# Patient Record
Sex: FEMALE | Race: WHITE | NOT HISPANIC OR LATINO | Employment: FULL TIME | URBAN - METROPOLITAN AREA
[De-identification: names, ages, dates, MRNs, and addresses within clinical notes are randomized per-mention and may not be internally consistent; named-entity substitution may affect disease eponyms.]

---

## 2024-03-26 ENCOUNTER — TRANSCRIBE ORDERS (OUTPATIENT)
Dept: OBSTETRICS AND GYNECOLOGY | Facility: HOSPITAL | Age: 34
End: 2024-03-26
Payer: MEDICAID

## 2024-03-26 DIAGNOSIS — O35.07X0 FETAL MICROCEPHALY AFFECTING ANTEPARTUM CARE OF MOTHER, SINGLE OR UNSPECIFIED FETUS: ICD-10-CM

## 2024-03-26 DIAGNOSIS — Z34.90 PREGNANCY, UNSPECIFIED GESTATIONAL AGE: ICD-10-CM

## 2024-03-26 DIAGNOSIS — O28.3 ABNORMAL FETAL ULTRASOUND: Primary | ICD-10-CM

## 2024-04-02 ENCOUNTER — HOSPITAL ENCOUNTER (OUTPATIENT)
Dept: WOMENS IMAGING | Facility: HOSPITAL | Age: 34
Discharge: HOME | End: 2024-04-02
Admitting: ADVANCED PRACTICE MIDWIFE
Payer: MEDICAID

## 2024-04-02 ENCOUNTER — OFFICE VISIT (OUTPATIENT)
Dept: OBSTETRICS AND GYNECOLOGY | Facility: HOSPITAL | Age: 34
End: 2024-04-02
Payer: MEDICAID

## 2024-04-02 VITALS
WEIGHT: 182.4 LBS | HEIGHT: 68 IN | SYSTOLIC BLOOD PRESSURE: 118 MMHG | DIASTOLIC BLOOD PRESSURE: 70 MMHG | BODY MASS INDEX: 27.65 KG/M2

## 2024-04-02 DIAGNOSIS — Z34.90 PREGNANCY, UNSPECIFIED GESTATIONAL AGE: ICD-10-CM

## 2024-04-02 DIAGNOSIS — Z03.73 FETAL ANOMALY SUSPECTED BUT NOT FOUND: ICD-10-CM

## 2024-04-02 DIAGNOSIS — O35.07X0 MICROCEPHALY OF FETUS: Primary | ICD-10-CM

## 2024-04-02 DIAGNOSIS — O28.3 ABNORMAL FETAL ULTRASOUND: ICD-10-CM

## 2024-04-02 DIAGNOSIS — O35.07X0 FETAL MICROCEPHALY AFFECTING ANTEPARTUM CARE OF MOTHER, SINGLE OR UNSPECIFIED FETUS: ICD-10-CM

## 2024-04-02 PROCEDURE — 76819 FETAL BIOPHYS PROFIL W/O NST: CPT

## 2024-04-02 PROCEDURE — 76811 OB US DETAILED SNGL FETUS: CPT

## 2024-04-02 PROCEDURE — 99203 OFFICE O/P NEW LOW 30 MIN: CPT | Performed by: OBSTETRICS & GYNECOLOGY

## 2024-04-02 PROCEDURE — 76811 OB US DETAILED SNGL FETUS: CPT | Performed by: OBSTETRICS & GYNECOLOGY

## 2024-04-02 PROCEDURE — 76819 FETAL BIOPHYS PROFIL W/O NST: CPT | Performed by: OBSTETRICS & GYNECOLOGY

## 2024-04-02 RX ORDER — FERROUS SULFATE 300 MG/5ML
300 LIQUID (ML) ORAL DAILY
COMMUNITY

## 2024-04-02 RX ORDER — CALCIUM CARBONATE 750 MG/1
750 TABLET, CHEWABLE ORAL AS NEEDED
COMMUNITY

## 2024-04-02 NOTE — ASSESSMENT & PLAN NOTE
Microcephaly is defined as a head circumference three standard deviations below the mean.  Microcephaly is often associated with ventriculomegaly, sloping forehead or other brain anomalies (67%).  The differential diagnosis can include craniosynostosis, atelencephaly or a normal variant for a small head size that could be familial.      There are multiple potential etiologies for microcephaly including teratogen exposure (alcohol, hydrantoin, radiation), prenatal infection (cytomegalovirus, rubella, toxoplasmosis) or part of numerous syndromes ([place], Violet-Laxova, etc).  Genetic abnormalities associated with microcephaly include trisomies 13, 18, 21, 4p- and 18q-.  It can also be associated with single gene defects such as Fanconi syndrome, Milton syndrome and maternal PKU.  Unfortunately, in many cases the etiology is often unknown.      The prognosis for an infant with microcephaly is dependent on the severity and underlying etiology.  Minor microcephaly is often associated with a normal outcome and a false positive diagnosis is common.  Newborns should be evaluation by pediatricians and/or pediatric neurologists to confirm the diagnosis.  If present as a , early childhood intervention programs that involve physical, speech and occupational therapists can help to maximize abilities and minimize dysfunction.      In fetuses with no other abnormalities and a normal-appearing CNS there is no increased risk of abnormalities if the head circumference is greater than 3 standard deviations below the mean.  This fetus has a head circumference between 1 and 2 standard deviations below the mean and would not be expected to be at increased risk for anomalies.

## 2024-04-02 NOTE — PROGRESS NOTES
"Documentation of the ultrasound findings, images, and interpretations will be available in the patient's Viewpoint report which is located in the imaging tab in chart review.    Maternal/Fetal Medicine Consult Note     Name: Kaylan Webb    : 1990     MRN: [id number]     Referring Provider: Francine Wang CNM    Chief Complaint  BPD, and Small HC     Subjective     History of Present Illness:  Kaylan Webb is a 33 y.o.  33w1d who presents today for fetal HC lag    REHAN: Estimated Date of Delivery: 24     ROS:   As noted in HPI.     Past Medical History:   Diagnosis Date    exercise induced Asthma       Past Surgical History:   Procedure Laterality Date    DILATATION AND CURETTAGE      WISDOM TOOTH EXTRACTION        OB History          4    Para   1    Term   1       0    AB   2    Living   1         SAB   2    IAB   0    Ectopic   0    Molar   0    Multiple   0    Live Births   1          Obstetric Comments   Fob #1 - Pregnancy #1 - #4                Objective     Vital Signs  /70   Ht 171.5 cm (67.5\")   Wt 82.7 kg (182 lb 6.4 oz)   LMP 2023   Estimated body mass index is 28.15 kg/m² as calculated from the following:    Height as of this encounter: 171.5 cm (67.5\").    Weight as of this encounter: 82.7 kg (182 lb 6.4 oz).    Physical Exam    Ultrasound Impression:   See [facility]    Assessment and Plan     Kaylan Webb is a 33 y.o.  33w1d who presents today for fetal HC lag    Diagnoses and all orders for this visit:    1. Microcephaly of fetus (Primary)  Assessment & Plan:  Microcephaly is defined as a head circumference three standard deviations below the mean.  Microcephaly is often associated with ventriculomegaly, sloping forehead or other brain anomalies (67%).  The differential diagnosis can include craniosynostosis, atelencephaly or a normal variant for a small head size that could be familial.      There are multiple potential etiologies for " microcephaly including teratogen exposure (alcohol, hydrantoin, radiation), prenatal infection (cytomegalovirus, rubella, toxoplasmosis) or part of numerous syndromes ([place], Violet-Laxova, etc).  Genetic abnormalities associated with microcephaly include trisomies 13, 18, 21, 4p- and 18q-.  It can also be associated with single gene defects such as Fanconi syndrome, Milton syndrome and maternal PKU.  Unfortunately, in many cases the etiology is often unknown.      The prognosis for an infant with microcephaly is dependent on the severity and underlying etiology.  Minor microcephaly is often associated with a normal outcome and a false positive diagnosis is common.  Newborns should be evaluation by pediatricians and/or pediatric neurologists to confirm the diagnosis.  If present as a , early childhood intervention programs that involve physical, speech and occupational therapists can help to maximize abilities and minimize dysfunction.      In fetuses with no other abnormalities and a normal-appearing CNS there is no increased risk of abnormalities if the head circumference is greater than 3 standard deviations below the mean.  This fetus has a head circumference between 1 and 2 standard deviations below the mean and would not be expected to be at increased risk for anomalies.      2. Fetal anomaly suspected but not found    3. Pregnancy, unspecified gestational age         Follow Up  No follow-ups on file.    I spent 15 minutes caring for the patient on the day of service. This included: obtaining or reviewing a separately obtained medical history, reviewing patient records, performing a medically appropriate exam and/or evaluation, counseling or educating the patient/family/caregiver, ordering medications, labs, and/or procedures and documenting such in the medical record. This does not include time spent on review and interpretation of other tests such as fetal ultrasound or the performance of  other procedures such as amniocentesis or CVS.      Douglas A. Milligan, MD  Maternal Fetal Medicine, [facility]     2024

## 2024-05-14 ENCOUNTER — HOSPITAL ENCOUNTER (INPATIENT)
Facility: HOSPITAL | Age: 34
LOS: 1 days | Discharge: HOME | End: 2024-05-15
Attending: ADVANCED PRACTICE MIDWIFE | Admitting: OBSTETRICS & GYNECOLOGY
Payer: MEDICAID

## 2024-05-14 PROBLEM — Z34.90 PREGNANCY: Status: RESOLVED | Noted: 2024-04-02 | Resolved: 2024-05-14

## 2024-05-14 PROBLEM — O35.07X0 MICROCEPHALY OF FETUS: Status: RESOLVED | Noted: 2024-04-02 | Resolved: 2024-05-14

## 2024-05-14 PROBLEM — Z34.90 TERM PREGNANCY: Status: ACTIVE | Noted: 2024-05-14

## 2024-05-14 PROBLEM — Z03.73 FETAL ANOMALY SUSPECTED BUT NOT FOUND: Status: RESOLVED | Noted: 2024-04-02 | Resolved: 2024-05-14

## 2024-05-14 LAB
ABO GROUP BLD: NORMAL
ABO GROUP BLD: NORMAL
BLD GP AB SCN SERPL QL: NEGATIVE
DEPRECATED RDW RBC AUTO: 46.5 FL (ref 37–54)
ERYTHROCYTE [DISTWIDTH] IN BLOOD BY AUTOMATED COUNT: 14.6 % (ref 12.3–15.4)
HCT VFR BLD AUTO: 41 % (ref 34–46.6)
HGB BLD-MCNC: 13.9 G/DL (ref 12–15.9)
MCH RBC QN AUTO: 29.8 PG (ref 26.6–33)
MCHC RBC AUTO-ENTMCNC: 33.9 G/DL (ref 31.5–35.7)
MCV RBC AUTO: 87.8 FL (ref 79–97)
PLATELET # BLD AUTO: 231 10*3/MM3 (ref 140–450)
PMV BLD AUTO: 10.4 FL (ref 6–12)
RBC # BLD AUTO: 4.67 10*6/MM3 (ref 3.77–5.28)
RH BLD: POSITIVE
RH BLD: POSITIVE
T PALLIDUM IGG SER QL: NORMAL
T&S EXPIRATION DATE: NORMAL
WBC NRBC COR # BLD AUTO: 12.39 10*3/MM3 (ref 3.4–10.8)

## 2024-05-14 PROCEDURE — 85027 COMPLETE CBC AUTOMATED: CPT | Performed by: ADVANCED PRACTICE MIDWIFE

## 2024-05-14 PROCEDURE — 86780 TREPONEMA PALLIDUM: CPT | Performed by: ADVANCED PRACTICE MIDWIFE

## 2024-05-14 PROCEDURE — 86900 BLOOD TYPING SEROLOGIC ABO: CPT | Performed by: ADVANCED PRACTICE MIDWIFE

## 2024-05-14 PROCEDURE — 25810000003 LACTATED RINGERS PER 1000 ML: Performed by: ADVANCED PRACTICE MIDWIFE

## 2024-05-14 PROCEDURE — 3E033VJ INTRODUCTION OF OTHER HORMONE INTO PERIPHERAL VEIN, PERCUTANEOUS APPROACH: ICD-10-PCS | Performed by: ADVANCED PRACTICE MIDWIFE

## 2024-05-14 PROCEDURE — 86900 BLOOD TYPING SEROLOGIC ABO: CPT

## 2024-05-14 PROCEDURE — 59025 FETAL NON-STRESS TEST: CPT

## 2024-05-14 PROCEDURE — 86850 RBC ANTIBODY SCREEN: CPT | Performed by: ADVANCED PRACTICE MIDWIFE

## 2024-05-14 PROCEDURE — 25810000003 SODIUM CHLORIDE 0.9 % SOLUTION: Performed by: ADVANCED PRACTICE MIDWIFE

## 2024-05-14 PROCEDURE — 86901 BLOOD TYPING SEROLOGIC RH(D): CPT | Performed by: ADVANCED PRACTICE MIDWIFE

## 2024-05-14 PROCEDURE — 25010000002 OXYTOCIN PER 10 UNITS: Performed by: ADVANCED PRACTICE MIDWIFE

## 2024-05-14 PROCEDURE — 10907ZC DRAINAGE OF AMNIOTIC FLUID, THERAPEUTIC FROM PRODUCTS OF CONCEPTION, VIA NATURAL OR ARTIFICIAL OPENING: ICD-10-PCS | Performed by: ADVANCED PRACTICE MIDWIFE

## 2024-05-14 PROCEDURE — 86901 BLOOD TYPING SEROLOGIC RH(D): CPT

## 2024-05-14 RX ORDER — OXYTOCIN/0.9 % SODIUM CHLORIDE 30/500 ML
250 PLASTIC BAG, INJECTION (ML) INTRAVENOUS CONTINUOUS
Status: ACTIVE | OUTPATIENT
Start: 2024-05-14 | End: 2024-05-14

## 2024-05-14 RX ORDER — ACETAMINOPHEN 325 MG/1
650 TABLET ORAL EVERY 4 HOURS PRN
Status: DISCONTINUED | OUTPATIENT
Start: 2024-05-14 | End: 2024-05-14 | Stop reason: HOSPADM

## 2024-05-14 RX ORDER — IBUPROFEN 600 MG/1
600 TABLET ORAL EVERY 6 HOURS PRN
Status: DISCONTINUED | OUTPATIENT
Start: 2024-05-14 | End: 2024-05-14

## 2024-05-14 RX ORDER — OXYTOCIN/0.9 % SODIUM CHLORIDE 30/500 ML
999 PLASTIC BAG, INJECTION (ML) INTRAVENOUS ONCE
Status: DISCONTINUED | OUTPATIENT
Start: 2024-05-14 | End: 2024-05-15 | Stop reason: HOSPADM

## 2024-05-14 RX ORDER — HYDROCODONE BITARTRATE AND ACETAMINOPHEN 10; 325 MG/1; MG/1
1 TABLET ORAL EVERY 4 HOURS PRN
Status: DISCONTINUED | OUTPATIENT
Start: 2024-05-14 | End: 2024-05-15 | Stop reason: HOSPADM

## 2024-05-14 RX ORDER — CARBOPROST TROMETHAMINE 250 UG/ML
250 INJECTION, SOLUTION INTRAMUSCULAR AS NEEDED
Status: DISCONTINUED | OUTPATIENT
Start: 2024-05-14 | End: 2024-05-14 | Stop reason: HOSPADM

## 2024-05-14 RX ORDER — ONDANSETRON 4 MG/1
4 TABLET, ORALLY DISINTEGRATING ORAL EVERY 6 HOURS PRN
Status: DISCONTINUED | OUTPATIENT
Start: 2024-05-14 | End: 2024-05-14 | Stop reason: HOSPADM

## 2024-05-14 RX ORDER — MISOPROSTOL 200 UG/1
800 TABLET ORAL AS NEEDED
Status: DISCONTINUED | OUTPATIENT
Start: 2024-05-14 | End: 2024-05-14 | Stop reason: HOSPADM

## 2024-05-14 RX ORDER — DOCUSATE SODIUM 100 MG/1
100 CAPSULE, LIQUID FILLED ORAL 2 TIMES DAILY
Status: DISCONTINUED | OUTPATIENT
Start: 2024-05-14 | End: 2024-05-15 | Stop reason: HOSPADM

## 2024-05-14 RX ORDER — ONDANSETRON 2 MG/ML
4 INJECTION INTRAMUSCULAR; INTRAVENOUS EVERY 6 HOURS PRN
Status: DISCONTINUED | OUTPATIENT
Start: 2024-05-14 | End: 2024-05-14

## 2024-05-14 RX ORDER — SODIUM CHLORIDE 0.9 % (FLUSH) 0.9 %
10 SYRINGE (ML) INJECTION EVERY 12 HOURS SCHEDULED
Status: DISCONTINUED | OUTPATIENT
Start: 2024-05-14 | End: 2024-05-14 | Stop reason: HOSPADM

## 2024-05-14 RX ORDER — ONDANSETRON 2 MG/ML
4 INJECTION INTRAMUSCULAR; INTRAVENOUS EVERY 6 HOURS PRN
Status: DISCONTINUED | OUTPATIENT
Start: 2024-05-14 | End: 2024-05-15 | Stop reason: HOSPADM

## 2024-05-14 RX ORDER — LIDOCAINE HYDROCHLORIDE 10 MG/ML
0.5 INJECTION, SOLUTION EPIDURAL; INFILTRATION; INTRACAUDAL; PERINEURAL ONCE AS NEEDED
Status: DISCONTINUED | OUTPATIENT
Start: 2024-05-14 | End: 2024-05-14 | Stop reason: HOSPADM

## 2024-05-14 RX ORDER — SODIUM CHLORIDE 9 MG/ML
40 INJECTION, SOLUTION INTRAVENOUS AS NEEDED
Status: DISCONTINUED | OUTPATIENT
Start: 2024-05-14 | End: 2024-05-14 | Stop reason: HOSPADM

## 2024-05-14 RX ORDER — BISACODYL 10 MG
10 SUPPOSITORY, RECTAL RECTAL DAILY PRN
Status: DISCONTINUED | OUTPATIENT
Start: 2024-05-15 | End: 2024-05-15 | Stop reason: HOSPADM

## 2024-05-14 RX ORDER — SODIUM CHLORIDE, SODIUM LACTATE, POTASSIUM CHLORIDE, CALCIUM CHLORIDE 600; 310; 30; 20 MG/100ML; MG/100ML; MG/100ML; MG/100ML
125 INJECTION, SOLUTION INTRAVENOUS CONTINUOUS
Status: DISCONTINUED | OUTPATIENT
Start: 2024-05-14 | End: 2024-05-15 | Stop reason: HOSPADM

## 2024-05-14 RX ORDER — ONDANSETRON 4 MG/1
4 TABLET, ORALLY DISINTEGRATING ORAL EVERY 6 HOURS PRN
Status: DISCONTINUED | OUTPATIENT
Start: 2024-05-14 | End: 2024-05-14

## 2024-05-14 RX ORDER — SODIUM CHLORIDE 0.9 % (FLUSH) 0.9 %
1-10 SYRINGE (ML) INJECTION AS NEEDED
Status: DISCONTINUED | OUTPATIENT
Start: 2024-05-14 | End: 2024-05-15 | Stop reason: HOSPADM

## 2024-05-14 RX ORDER — HYDROCORTISONE 25 MG/G
1 CREAM TOPICAL AS NEEDED
Status: DISCONTINUED | OUTPATIENT
Start: 2024-05-14 | End: 2024-05-15 | Stop reason: HOSPADM

## 2024-05-14 RX ORDER — OXYCODONE AND ACETAMINOPHEN 10; 325 MG/1; MG/1
2 TABLET ORAL EVERY 4 HOURS PRN
Status: DISCONTINUED | OUTPATIENT
Start: 2024-05-14 | End: 2024-05-14 | Stop reason: HOSPADM

## 2024-05-14 RX ORDER — ACETAMINOPHEN 325 MG/1
650 TABLET ORAL EVERY 6 HOURS PRN
Status: DISCONTINUED | OUTPATIENT
Start: 2024-05-14 | End: 2024-05-15 | Stop reason: HOSPADM

## 2024-05-14 RX ORDER — NALOXONE HCL 0.4 MG/ML
0.4 VIAL (ML) INJECTION
Status: DISCONTINUED | OUTPATIENT
Start: 2024-05-14 | End: 2024-05-14 | Stop reason: HOSPADM

## 2024-05-14 RX ORDER — PRENATAL WITH FERROUS FUM AND FOLIC ACID 3080; 920; 120; 400; 22; 1.84; 3; 20; 10; 1; 12; 200; 27; 25; 2 [IU]/1; [IU]/1; MG/1; [IU]/1; MG/1; MG/1; MG/1; MG/1; MG/1; MG/1; UG/1; MG/1; MG/1; MG/1; MG/1
1 TABLET ORAL DAILY
COMMUNITY

## 2024-05-14 RX ORDER — HYDROCODONE BITARTRATE AND ACETAMINOPHEN 5; 325 MG/1; MG/1
1 TABLET ORAL EVERY 4 HOURS PRN
Status: DISCONTINUED | OUTPATIENT
Start: 2024-05-14 | End: 2024-05-15 | Stop reason: HOSPADM

## 2024-05-14 RX ORDER — SODIUM CHLORIDE 0.9 % (FLUSH) 0.9 %
10 SYRINGE (ML) INJECTION AS NEEDED
Status: DISCONTINUED | OUTPATIENT
Start: 2024-05-14 | End: 2024-05-14 | Stop reason: HOSPADM

## 2024-05-14 RX ORDER — MAGNESIUM CARB/ALUMINUM HYDROX 105-160MG
30 TABLET,CHEWABLE ORAL ONCE
Status: DISCONTINUED | OUTPATIENT
Start: 2024-05-14 | End: 2024-05-14 | Stop reason: HOSPADM

## 2024-05-14 RX ORDER — OXYTOCIN/0.9 % SODIUM CHLORIDE 30/500 ML
125 PLASTIC BAG, INJECTION (ML) INTRAVENOUS ONCE AS NEEDED
Status: DISCONTINUED | OUTPATIENT
Start: 2024-05-14 | End: 2024-05-15 | Stop reason: HOSPADM

## 2024-05-14 RX ORDER — METHYLERGONOVINE MALEATE 0.2 MG/ML
200 INJECTION INTRAVENOUS ONCE AS NEEDED
Status: DISCONTINUED | OUTPATIENT
Start: 2024-05-14 | End: 2024-05-14 | Stop reason: HOSPADM

## 2024-05-14 RX ORDER — MORPHINE SULFATE 2 MG/ML
1 INJECTION, SOLUTION INTRAMUSCULAR; INTRAVENOUS EVERY 4 HOURS PRN
Status: DISCONTINUED | OUTPATIENT
Start: 2024-05-14 | End: 2024-05-14 | Stop reason: HOSPADM

## 2024-05-14 RX ORDER — OXYTOCIN/0.9 % SODIUM CHLORIDE 30/500 ML
2-20 PLASTIC BAG, INJECTION (ML) INTRAVENOUS
Status: DISCONTINUED | OUTPATIENT
Start: 2024-05-14 | End: 2024-05-14 | Stop reason: HOSPADM

## 2024-05-14 RX ORDER — TERBUTALINE SULFATE 1 MG/ML
0.25 INJECTION, SOLUTION SUBCUTANEOUS AS NEEDED
Status: DISCONTINUED | OUTPATIENT
Start: 2024-05-14 | End: 2024-05-14 | Stop reason: HOSPADM

## 2024-05-14 RX ORDER — IBUPROFEN 600 MG/1
600 TABLET ORAL EVERY 6 HOURS PRN
Status: DISCONTINUED | OUTPATIENT
Start: 2024-05-14 | End: 2024-05-15 | Stop reason: HOSPADM

## 2024-05-14 RX ADMIN — OXYTOCIN 2 MILLI-UNITS/MIN: 10 INJECTION INTRAVENOUS at 14:42

## 2024-05-14 RX ADMIN — DOCUSATE SODIUM 100 MG: 100 CAPSULE, LIQUID FILLED ORAL at 21:50

## 2024-05-14 RX ADMIN — IBUPROFEN 600 MG: 600 TABLET, FILM COATED ORAL at 22:45

## 2024-05-14 RX ADMIN — WITCH HAZEL: 500 SOLUTION RECTAL; TOPICAL at 21:50

## 2024-05-14 RX ADMIN — Medication: at 21:50

## 2024-05-14 RX ADMIN — SODIUM CHLORIDE, POTASSIUM CHLORIDE, SODIUM LACTATE AND CALCIUM CHLORIDE 125 ML/HR: 600; 310; 30; 20 INJECTION, SOLUTION INTRAVENOUS at 16:45

## 2024-05-14 NOTE — H&P
JEANNINE Chowdhury  Obstetric History and Physical    Chief Complaint   Patient presents with    Scheduled Induction       Subjective     Patient is a 33 y.o. female  currently at 39w1d, who presents from office with advanced dilation and baby in -3 station.  Advised that she needs to be admitted and use Pitocin to get vertex in pelvis and then AROM,  Agreed to plan. On intake denies  contractions, denies  leakage of fluid, denies  vaginal bleeding. reports fetal movement.    Her prenatal care is benign.  Her previous obstetric/gynecological history is noted for is non-contributory.    The following portions of the patients history were reviewed and updated as appropriate: past medical history, past surgical history, past family history, and past social history .       Prenatal Information:  Prenatal Results       Initial Prenatal Labs       Test Value Reference Range Date Time    Hemoglobin ^ 13.7 g/dL 11.2 - 15.7 11/15/23 1554    Hematocrit ^ 41.7 % 34.0 - 45.0 11/15/23 1554    Platelets ^ 273 10*3/uL 155 - 369 11/15/23 1554    Rubella IgG        Hepatitis B SAg ^ Negative  Negative 11/15/23 1554    Hepatitis C Ab        RPR ^ Nonreactive  Non Reactive  11/15/23 1554    T. Pallidum Ab         ABO  A   24 1202    Rh  Positive   24 1202    Antibody Screen        HIV ^ Non Reactive  Non Reactive 11/15/23 1554    Urine Culture        Gonorrhea        Chlamydia        TSH        HgB A1c  ^ 4.9 % <5.7 11/15/23 1554    Varicella IgG        HgB Electrophoresis         Cystic fibrosis                   Fetal testing        Test Value Reference Range Date Time    NIPT        MSAFP        AFP-4                  2nd and 3rd Trimester       Test Value Reference Range Date Time    Hemoglobin (repeated)  13.9 g/dL 12.0 - 15.9 24 1202      ^ 12.6 g/dL 11.2 - 15.7 24 1007    Hematocrit (repeated)  41.0 % 34.0 - 46.6 24 1202      ^ 39.5 % 34.0 - 45.0 24 1007    Platelets   231 10*3/mm3 140 - 450  24 1202      ^ 241 10*3/uL 155 - 369 24 1007      ^ 273 10*3/uL 155 - 369 11/15/23 1554    GCT        Antibody Screen (repeated)  Negative   24 1202    3rd TM syphilis scrn (repeated)  RPR         3rd TM syphilis scrn (repeated) FTA        GTT Fasting        GTT 1 Hr        GTT 2 Hr        GTT 3 Hr        Group B Strep                  Other testing        Test Value Reference Range Date Time    Parvo IgG         CMV IgG                   Drug Screening       Test Value Reference Range Date Time    Amphetamine Screen        Barbiturate Screen ^ Negative  Cutoff: 200 ng/mL 11/15/23 1515    Benzodiazepine Screen ^ Negative  Cutoff: 200 ng/mL 11/15/23 1515    Methadone Screen ^ Negative  Cutoff: 300 ng/mL 11/15/23 1515    Phencyclidine Screen        Opiates Screen ^ Negative  Cutoff: 300 ng/mL 11/15/23 1515    THC Screen ^ Negative  Cutoff: 50 ng/mL 11/15/23 1515    Cocaine Screen ^ Negative  Cutoff: 300 ng/mL 11/15/23 1515    Propoxyphene Screen        Buprenorphine Screen        Methamphetamine Screen        Oxycodone Screen ^ Negative  Cutoff: 100 ng/mL 11/15/23 1515    Tricyclic Antidepressants Screen                  Legend    ^: Historical                               Past OB History:       OB History    Para Term  AB Living   4 1 1 0 2 1    IAB Ectopic Molar Multiple Live Births   2 0 0 0 0 1      # Outcome Date GA Lbr Jose/2nd Weight Sex Type Anes PTL Lv   4 Current            3 2023           2 Term 22 40w0d  3799 g (8 lb 6 oz) M Vag-Spont   ELODIA      Name: De Higuera 2020 9w0d             Obstetric Comments   Fob #1 - Pregnancy #1 - #4        Past Medical History: Past Medical History:   Diagnosis Date    exercise induced Asthma       Past Surgical History Past Surgical History:   Procedure Laterality Date    DILATATION AND CURETTAGE      WISDOM TOOTH EXTRACTION        Family History: Family History   Problem Relation Age of Onset    Cancer Mother      Obesity Father     Cancer Maternal Grandmother       Social History:  reports that she has never smoked. She has never used smokeless tobacco.   reports that she does not currently use alcohol.   reports no history of drug use.        REVIEW OF SYSTEMS              Reports fetal movement is normal             Denies leakage of amniotic fluid.             Denies vaginal bleeding             She reports No contractions, Occasional contractions, intensity mild             All other systems reviewed and are negative    Objective       Vital Signs Range for the last 24 hours  Temperature: Temp:  [97.5 °F (36.4 °C)-98.3 °F (36.8 °C)] 98.3 °F (36.8 °C)   Temp Source: Temp src: Oral   BP: BP: (134)/(80) 134/80   Pulse: Heart Rate:  [106] 106   Respirations: Resp:  [16-18] 18   SPO2:     O2 Amount (l/min):     O2 Devices     Weight: Weight:  [86.2 kg (190 lb)] 86.2 kg (190 lb)       Presentation: vertex   Cervix: Exam by: Method: sterile exam per physician   Dilation: Cervical Dilation (cm): 5-6   Effacement: Cervical Effacement: 70-80%   Station: Fetal Station: -2        Fetal Heart Rate Assessment   Method: Fetal HR Assessment Method: external   Beats/min: Fetal HR (beats/min): 145   Baseline: Fetal HR Baseline: normal range   Varibility: Fetal HR Variability: moderate (amplitude range 6 to 25 bpm)   Accels: Fetal HR Accelerations: greater than/equal to 15 bpm, lasting at least 15 seconds   Decels: Fetal HR Decelerations: absent   Tracing Category:       Uterine Assessment   Method: Method: external tocotransducer   Frequency (min): Contraction Frequency (Minutes): x3   Ctx Count in 10 min:     Duration:     Intensity:     Intensity by IUPC:     Resting Tone: Uterine Resting Tone: soft by palpation   Resting Tone by IUPC:     [place] Units:         Constitutional:  Well developed, well nourished, no acute distress, well-groomed.   Respiratory:  Lungs are clear to auscultation bilaterally, normal breath sounds.    Cardiovascular:  Normal rate and rhythm, no murmurs.   Gastrointestinal:  Soft, gravid, nontender.  Uterus: Soft, nontender. Fundus appropriate for dates.  Neurologic:  Alert & oriented x 3,  no focal deficits noted.   Psychiatric:  Speech and behavior appropriate.   Extremities: no cyanosis, clubbing or edema, no evidence of DVT.        Labs:  Lab Results   Component Value Date    WBC 12.39 (H) 2024    HGB 13.9 2024    HCT 41.0 2024    MCV 87.8 2024     2024     Results from last 7 days   Lab Units 24  1202   ABO TYPING  A   RH TYPING  Positive   ANTIBODY SCREEN  Negative           Assessment & Plan       Term pregnancy        Assessment:   IUP at 39w1d weeks gestation with reactive fetal status.    2.    Advanced dilation and vertex high,   3.   Obstetrical history significant for   x 1 .  4.   GBS status: Negative    5.   Rh: positive    Plan:  Oxytocin induction  Continuous FHT and TOCO monitoring.   Diet: Clear liquids  Desires to go natural.  Vertex now in pelvis, AROM clear fluid  Plan of care has been reviewed with patient and questions answered.  Risks, benefits of treatment plan have been discussed.   Consent obtained to proceed with labor management and subsequent delivery of baby.        Francine Wang CNM  2024  16:34 EDT

## 2024-05-14 NOTE — L&D DELIVERY NOTE
JEANNINE Chowdhury  Vaginal Delivery Note  05/14/24  19:30 EDT      Delivery     Delivery: Vaginal, Spontaneous     YOB: 2022    Time of Birth: 6:33 PM   39w1d      Anesthesia: None     Delivering clinician: Francine Wang                Delivery narrative:    Patient at 39w1d pushed to deliver a viable female infant over an intact perineum without anesthesia. Infant's head, anterior shoulder, and body delivered atraumatically. Vigorous infant was placed on mom's abdomen where the cord was allowed to stop pulsating and was clamped x2 and cut by FOB. Placenta delivered spontaneously and appeared to be intact. Pitocin to IV after delivery of baby. EBL 50. Mother and infant stable, bonding      Infant    Findings: female  infant     Infant observations: Weight: 3370 g (7 lb 6.9 oz)   Length: 19.5  in  Observations/Comments:        Apgars: 8  @ 1 minute /    9  @ 5 minutes         Placenta, Cord, and Fluid    Placenta delivered  Spontaneous  at  5/14/2024  6:38 PM     Cord: 3 vessels  present.   Nuchal Cord?  no   Cord blood obtained: Yes    Cord gases obtained:  No              Repair    Episiotomy: No       Estimated Blood Loss: 50  mls.   Suture used for repair: None needed     Complications  none    Disposition  Mother to Mother Baby/Postpartum  in stable condition currently.  Baby to remains with mom  in stable condition currently.      Francine Wang CNM  05/14/24  19:30 EDT

## 2024-05-15 VITALS
HEART RATE: 64 BPM | SYSTOLIC BLOOD PRESSURE: 115 MMHG | TEMPERATURE: 97.9 F | RESPIRATION RATE: 16 BRPM | WEIGHT: 190 LBS | DIASTOLIC BLOOD PRESSURE: 75 MMHG | HEIGHT: 67 IN | BODY MASS INDEX: 29.82 KG/M2

## 2024-05-15 PROBLEM — Z34.90 TERM PREGNANCY: Status: RESOLVED | Noted: 2024-05-14 | Resolved: 2024-05-15

## 2024-05-15 LAB
BASOPHILS # BLD AUTO: 0.03 10*3/MM3 (ref 0–0.2)
BASOPHILS NFR BLD AUTO: 0.2 % (ref 0–1.5)
DEPRECATED RDW RBC AUTO: 46.4 FL (ref 37–54)
EOSINOPHIL # BLD AUTO: 0.07 10*3/MM3 (ref 0–0.4)
EOSINOPHIL NFR BLD AUTO: 0.4 % (ref 0.3–6.2)
ERYTHROCYTE [DISTWIDTH] IN BLOOD BY AUTOMATED COUNT: 14.4 % (ref 12.3–15.4)
HCT VFR BLD AUTO: 36.6 % (ref 34–46.6)
HGB BLD-MCNC: 12.2 G/DL (ref 12–15.9)
IMM GRANULOCYTES # BLD AUTO: 0.12 10*3/MM3 (ref 0–0.05)
IMM GRANULOCYTES NFR BLD AUTO: 0.8 % (ref 0–0.5)
LYMPHOCYTES # BLD AUTO: 1.83 10*3/MM3 (ref 0.7–3.1)
LYMPHOCYTES NFR BLD AUTO: 11.5 % (ref 19.6–45.3)
MCH RBC QN AUTO: 29.8 PG (ref 26.6–33)
MCHC RBC AUTO-ENTMCNC: 33.3 G/DL (ref 31.5–35.7)
MCV RBC AUTO: 89.3 FL (ref 79–97)
MONOCYTES # BLD AUTO: 1.64 10*3/MM3 (ref 0.1–0.9)
MONOCYTES NFR BLD AUTO: 10.3 % (ref 5–12)
NEUTROPHILS NFR BLD AUTO: 12.27 10*3/MM3 (ref 1.7–7)
NEUTROPHILS NFR BLD AUTO: 76.8 % (ref 42.7–76)
NRBC BLD AUTO-RTO: 0 /100 WBC (ref 0–0.2)
PLATELET # BLD AUTO: 196 10*3/MM3 (ref 140–450)
PMV BLD AUTO: 10.9 FL (ref 6–12)
RBC # BLD AUTO: 4.1 10*6/MM3 (ref 3.77–5.28)
WBC NRBC COR # BLD AUTO: 15.96 10*3/MM3 (ref 3.4–10.8)

## 2024-05-15 PROCEDURE — 85025 COMPLETE CBC W/AUTO DIFF WBC: CPT | Performed by: ADVANCED PRACTICE MIDWIFE

## 2024-05-15 RX ADMIN — IBUPROFEN 600 MG: 600 TABLET, FILM COATED ORAL at 04:46

## 2024-05-15 RX ADMIN — IBUPROFEN 600 MG: 600 TABLET, FILM COATED ORAL at 11:37

## 2024-05-15 RX ADMIN — DOCUSATE SODIUM 100 MG: 100 CAPSULE, LIQUID FILLED ORAL at 09:33

## 2024-05-15 NOTE — DISCHARGE SUMMARY
[facility]  Vaginal delivery discharge summary      Patient: Kaylan Webb      MR#:[id number]  Admission  Diagnosis: Present on Admission:  **None**     Discharge Diagnosis: Active and Resolved Problems  Active Hospital Problems    Diagnosis  POA     (normal spontaneous vaginal delivery) [O80]  Not Applicable      Resolved Hospital Problems    Diagnosis Date Resolved POA    **Term pregnancy [Z34.90] 05/15/2024 Not Applicable            Date of Admission: 2024  Date of Discharge:  5/15/2024    Procedures:  Vaginal, Spontaneous     2024    6:33 PM      Service:  Obstetrics    Hospital Course/Assessment:  Patient underwent vaginal delivery and remained in the hospital for 1 days.  She is wanting to go home tonight, advised against it but will discharge her unless she decides to stay. During that time she remained afebrile and hemodynamically stable.  On the day of discharge, she was eating, ambulating and voiding without difficulty.  Fundus firm, lochia small, perineum intact. breast feeding and infant is doing well.  She is in no acute distress and ready for discharge.    Vitals:    05/15/24 0831   BP: 121/63   Pulse: 88   Resp: 20   Temp: 97.6 °F (36.4 °C)         Labs:    Lab Results   Component Value Date    WBC 15.96 (H) 05/15/2024    HGB 12.2 05/15/2024    HCT 36.6 05/15/2024    MCV 89.3 05/15/2024     05/15/2024     Results from last 7 days   Lab Units 24  1546 24  1202   ABO TYPING  A A   RH TYPING  Positive Positive   ANTIBODY SCREEN   --  Negative          Discharge Medications        ASK your doctor about these medications        Instructions Start Date   calcium carbonate  MG chewable tablet  Commonly known as: TUMS EX   750 mg, Oral, As Needed      Ferrous Sulfate 300 (60 Fe) MG/5ML solution   300 mg, Oral, Daily      IRON PO   1 tablet, Oral, Daily      MAGNESIUM PO   1 tablet, Oral, Daily      Prenatal 27-1 27-1 MG tablet tablet   1 tablet, Oral, Daily                  Discharge Disposition:  To Home    Discharge Condition:  Stable    Discharge Diet: Regular    Activity at Discharge: Pelvic rest    Follow-up Appointments  Follow up with Francine Wang in 6 weeks.     Francine Wang CNM  05/15/24  13:15 EDT

## 2024-05-15 NOTE — PROGRESS NOTES
5/15/2024  PPD #1    Subjective   Kaylan feels well.  Patient describes her lochia as less than menses.  Pain is well controlled, breast feeding well       Objective   Temp: Temp:  [97.5 °F (36.4 °C)-98.5 °F (36.9 °C)] 97.9 °F (36.6 °C) Temp src: Oral   BP: BP: (115-138)/(56-80) 119/59        Pulse: Heart Rate:  [] 84  RR: Resp:  [16-18] 18    General:  No acute distress   Abdomen: Fundus firm and beneath umbilicus   Pelvis: deferred     Lab Results   Component Value Date    WBC 15.96 (H) 05/15/2024    HGB 12.2 05/15/2024    HCT 36.6 05/15/2024    MCV 89.3 05/15/2024     05/15/2024    HEPBSAG Negative 11/15/2023     Results from last 7 days   Lab Units 05/14/24  1546 05/14/24  1202   ABO TYPING  A A   RH TYPING  Positive Positive   ANTIBODY SCREEN   --  Negative       Assessment  PPD# 1 after vaginal delivery    Plan  Supportive care, anticipate d/c in am.      This note has been electronically signed.    Francine Wang CNM  08:21 EDT  May 15, 2024

## 2024-05-15 NOTE — PLAN OF CARE
Goal Outcome Evaluation:  Plan of Care Reviewed With: patient, spouse        Progress: improving                              Ready for d/c

## 2024-05-15 NOTE — LACTATION NOTE
05/15/24 0215   Maternal Information   Date of Referral 05/15/24   Person Making Referral lactation consultant  (courtesy visit, newly postpartum)   Maternal Reason for Referral breastfeeding currently   Infant Reason for Referral  infant   Milk Expression/Equipment   Breast Pump Type double electric, personal  (Old Spectra and new Uma Stride)     Reviewed breastfeeding tips handouts and information with parents. They verbalized understanding. Mom states she breast fed #1 x 18 months. She had oversupply at around 4 months and used a nipple shield to help with milk flow at that time. Reports left nipple is inverted, but does come out and this baby has latched on it. She reports breastfeeding going well, denies pain, reports great times so far. She is independent but would like latch check tomorrow. Encouraged to call lactation with any questions/concerns. Encouraged to call outpatient clinic prn after discharge.

## 2024-05-15 NOTE — PAYOR COMM NOTE
"Jon Kathy Theodora (33 y.o. Female)       Date of Birth   1990    Social Security Number       Address   [address] [id number]    Home Phone   [phone]    MRN   [id number]       Tenriism   None    Marital Status                               Admission Date   24    Admission Type   Elective    Admitting Provider   Aimee Shoemaker MD    Attending Provider   Francine Wang CNM    Department, Room/Bed   [facility] MOTHER BABY 4B, N435/1       Discharge Date       Discharge Disposition       Discharge Destination                                 Attending Provider: Francine Wang CNM    Allergies: Amoxicillin-pot Clavulanate    Isolation: None   Infection: None   Code Status: CPR    Ht: 170.2 cm (67\")   Wt: 86.2 kg (190 lb)    Admission Cmt: None   Principal Problem: Term pregnancy [Z34.90]                   Active Insurance as of 2024       Primary Coverage       Payor Plan Insurance Group Employer/Plan Group    ANTHEM MEDICAID ANTHEM MEDICAID [id number]       Payor Plan Address Payor Plan Phone Number Payor Plan Fax Number Effective Dates    [address] [address] [phone]  2023 - None Entered    [facility] [id number]         Subscriber Name Subscriber Birth Date Member ID       KATHY WEBB THEODORA 1990 [id number]                     Emergency Contacts        (Rel.) Home Phone Work Phone Mobile Phone    Girish Webb (Spouse) -- -- [phone]              Insurance Information                  ANTHEM MEDICAID/ANTHEM MEDICAID Phone: [phone]    Subscriber: Jon Kathy Theodora Subscriber#: [id number]    Group#: [id number] Precert#: --             History & Physical        Francine Wang CNM at [address] [place] [address]  Obstetric History and Physical    Chief Complaint   Patient presents with    Scheduled Induction       Subjective    Patient is a 33 y.o. female  currently at 39w1d, who presents " from office with advanced dilation and baby in -3 station.  Advised that she needs to be admitted and use Pitocin to get vertex in pelvis and then AROM,  Agreed to plan. On intake denies  contractions, denies  leakage of fluid, denies  vaginal bleeding. reports fetal movement.    Her prenatal care is benign.  Her previous obstetric/gynecological history is noted for is non-contributory.    The following portions of the patients history were reviewed and updated as appropriate: past medical history, past surgical history, past family history, and past social history .       Prenatal Information:  Prenatal Results       Initial Prenatal Labs       Test Value Reference Range Date Time    Hemoglobin ^ 13.7 g/dL 11.2 - 15.7 11/15/23 1554    Hematocrit ^ 41.7 % 34.0 - 45.0 11/15/23 1554    Platelets ^ 273 10*3/uL 155 - 369 11/15/23 1554    Rubella IgG        Hepatitis B SAg ^ Negative  Negative 11/15/23 1554    Hepatitis C Ab        RPR ^ Nonreactive  Non Reactive  11/15/23 1554    T. Pallidum Ab         ABO  A   05/14/24 1202    Rh  Positive   05/14/24 1202    Antibody Screen        HIV ^ Non Reactive  Non Reactive 11/15/23 1554    Urine Culture        Gonorrhea        Chlamydia        TSH        HgB A1c  ^ 4.9 % <5.7 11/15/23 1554    Varicella IgG        HgB Electrophoresis         Cystic fibrosis                   Fetal testing        Test Value Reference Range Date Time    NIPT        MSAFP        AFP-4                  2nd and 3rd Trimester       Test Value Reference Range Date Time    Hemoglobin (repeated)  13.9 g/dL 12.0 - 15.9 05/14/24 1202      ^ 12.6 g/dL 11.2 - 15.7 02/29/24 1007    Hematocrit (repeated)  41.0 % 34.0 - 46.6 05/14/24 1202      ^ 39.5 % 34.0 - 45.0 02/29/24 1007    Platelets   231 10*3/mm3 140 - 450 05/14/24 1202      ^ 241 10*3/uL 155 - 369 02/29/24 1007      ^ 273 10*3/uL 155 - 369 11/15/23 1554    GCT        Antibody Screen (repeated)  Negative   05/14/24 1202    3rd TM syphilis scrn  (repeated)  RPR         3rd TM syphilis scrn (repeated) FTA        GTT Fasting        GTT 1 Hr        GTT 2 Hr        GTT 3 Hr        Group B Strep                  Other testing        Test Value Reference Range Date Time    Parvo IgG         CMV IgG                   Drug Screening       Test Value Reference Range Date Time    Amphetamine Screen        Barbiturate Screen ^ Negative  Cutoff: 200 ng/mL 11/15/23 1515    Benzodiazepine Screen ^ Negative  Cutoff: 200 ng/mL 11/15/23 1515    Methadone Screen ^ Negative  Cutoff: 300 ng/mL 11/15/23 1515    Phencyclidine Screen        Opiates Screen ^ Negative  Cutoff: 300 ng/mL 11/15/23 1515    THC Screen ^ Negative  Cutoff: 50 ng/mL 11/15/23 1515    Cocaine Screen ^ Negative  Cutoff: 300 ng/mL 11/15/23 1515    Propoxyphene Screen        Buprenorphine Screen        Methamphetamine Screen        Oxycodone Screen ^ Negative  Cutoff: 100 ng/mL 11/15/23 151    Tricyclic Antidepressants Screen                  Legend    ^: Historical                               Past OB History:       OB History    Para Term  AB Living   4 1 1 0 2 1   SAB IAB Ectopic Molar Multiple Live Births   2 0 0 0 0 1      # Outcome Date GA Lbr Jose/2nd Weight Sex Type Anes PTL Lv   4 Current            3 2023           2 Term 22 40w0d  3799 g (8 lb 6 oz) M Vag-Spont   ELODIA      Name: De Higuera 2020 9w0d             Obstetric Comments   Fob #1 - Pregnancy #1 - #4        Past Medical History: Past Medical History:   Diagnosis Date    exercise induced Asthma       Past Surgical History Past Surgical History:   Procedure Laterality Date    DILATATION AND CURETTAGE      WISDOM TOOTH EXTRACTION        Family History: Family History   Problem Relation Age of Onset    Cancer Mother     Obesity Father     Cancer Maternal Grandmother       Social History:  reports that she has never smoked. She has never used smokeless tobacco.   reports that she does not currently use alcohol.    reports no history of drug use.        REVIEW OF SYSTEMS              Reports fetal movement is normal             Denies leakage of amniotic fluid.             Denies vaginal bleeding             She reports No contractions, Occasional contractions, intensity mild             All other systems reviewed and are negative    Objective      Vital Signs Range for the last 24 hours  Temperature: Temp:  [97.5 °F (36.4 °C)-98.3 °F (36.8 °C)] 98.3 °F (36.8 °C)   Temp Source: Temp src: Oral   BP: BP: (134)/(80) 134/80   Pulse: Heart Rate:  [106] 106   Respirations: Resp:  [16-18] 18   SPO2:     O2 Amount (l/min):     O2 Devices     Weight: Weight:  [86.2 kg (190 lb)] 86.2 kg (190 lb)       Presentation: vertex   Cervix: Exam by: Method: sterile exam per physician   Dilation: Cervical Dilation (cm): 5-6   Effacement: Cervical Effacement: 70-80%   Station: Fetal Station: -2        Fetal Heart Rate Assessment   Method: Fetal HR Assessment Method: external   Beats/min: Fetal HR (beats/min): 145   Baseline: Fetal HR Baseline: normal range   Varibility: Fetal HR Variability: moderate (amplitude range 6 to 25 bpm)   Accels: Fetal HR Accelerations: greater than/equal to 15 bpm, lasting at least 15 seconds   Decels: Fetal HR Decelerations: absent   Tracing Category:       Uterine Assessment   Method: Method: external tocotransducer   Frequency (min): Contraction Frequency (Minutes): x3   Ctx Count in 10 min:     Duration:     Intensity:     Intensity by IUPC:     Resting Tone: Uterine Resting Tone: soft by palpation   Resting Tone by IUPC:     [place] Units:         Constitutional:  Well developed, well nourished, no acute distress, well-groomed.   Respiratory:  Lungs are clear to auscultation bilaterally, normal breath sounds.   Cardiovascular:  Normal rate and rhythm, no murmurs.   Gastrointestinal:  Soft, gravid, nontender.  Uterus: Soft, nontender. Fundus appropriate for dates.  Neurologic:  Alert & oriented x 3,  no focal  deficits noted.   Psychiatric:  Speech and behavior appropriate.   Extremities: no cyanosis, clubbing or edema, no evidence of DVT.        Labs:  Lab Results   Component Value Date    WBC 12.39 (H) 2024    HGB 13.9 2024    HCT 41.0 2024    MCV 87.8 2024     2024     Results from last 7 days   Lab Units 24  1202   ABO TYPING  A   RH TYPING  Positive   ANTIBODY SCREEN  Negative           Assessment & Plan      Term pregnancy        Assessment:   IUP at 39w1d weeks gestation with reactive fetal status.    2.    Advanced dilation and vertex high,   3.   Obstetrical history significant for   x 1 .  4.   GBS status: Negative    5.   Rh: positive    Plan:  Oxytocin induction  Continuous FHT and TOCO monitoring.   Diet: Clear liquids  Desires to go natural.  Vertex now in pelvis, AROM clear fluid  Plan of care has been reviewed with patient and questions answered.  Risks, benefits of treatment plan have been discussed.   Consent obtained to proceed with labor management and subsequent delivery of baby.        Francine Wang CNM  2024  16:34 EDT    Electronically signed by Francine Wang CNM at 24 1637       H&P signed by [employer], [place] at 24 1120         [Media Unavailable] Scan on 2024 1119 by [employer]: PRENATAL RECORDS          Electronically signed by [employer], [employer] at 24 1120       Physician Progress Notes (last 24 hours)  Notes from 24 2242 through 24 224   No notes of this type exist for this encounter.       Maternal Vitals (last day)       Date/Time Temp Pulse Resp BP SpO2 Weight    240 97.7 (36.5) 97 16 118/66 -- --    24 98.5 (36.9) 89 16 122/71 -- --    24 98.4 (36.9) 82 18 124/72 -- --    24 -- 93 -- 115/66 -- --    24 -- 86 -- 123/67 -- --    24 -- 85 -- 126/56 -- --    24 98.2 (36.8) 94 18 138/62 -- --    24 -- --  -- -- -- 86.2 (190)    05/14/24 1852 97.9 (36.6) 95 18 133/66 -- --    05/14/24 1647 -- 81 -- 137/76 -- --    05/14/24 1520 98.3 (36.8) -- 18 -- -- --    05/14/24 1503 -- 98 -- 133/78 -- --    05/14/24 1121 97.5 (36.4) 106 16 134/80 -- 86.2 (190)          FHR (last day)        Date/Time Fetal HR Assessment Method Fetal HR (beats/min) Fetal HR Baseline Fetal HR Variability Fetal HR Accelerations Fetal HR Decelerations Fetal HR Tracing Category    05/14/24 1830 intermittent auscultation, using Doppler  150  --  --  --  --  --     05/14/24 1800 external  125  normal range  moderate (amplitude range 6 to 25 bpm)  greater than/equal to 15 bpm  absent;other (see comments)  patient sitting straight up fom [id number]  --     05/14/24 1745 external  135  normal range  moderate (amplitude range 6 to 25 bpm)  greater than/equal to 15 bpm  absent  --     05/14/24 1730 external  145  normal range  moderate (amplitude range 6 to 25 bpm)  greater than/equal to 15 bpm  absent  --     05/14/24 1715 external  140  normal range  moderate (amplitude range 6 to 25 bpm)  greater than/equal to 15 bpm  absent  --     05/14/24 1700 external  155  normal range  moderate (amplitude range 6 to 25 bpm)  greater than/equal to 15 bpm;lasting at least 15 seconds  absent  --     05/14/24 1645 external  150  normal range  moderate (amplitude range 6 to 25 bpm)  greater than/equal to 15 bpm;lasting at least 15 seconds  absent  --     05/14/24 1630 external  135  normal range  moderate (amplitude range 6 to 25 bpm)  greater than/equal to 15 bpm;lasting at least 15 seconds  absent  --     05/14/24 1615 external  135  normal range  moderate (amplitude range 6 to 25 bpm)  greater than/equal to 15 bpm;lasting at least 15 seconds  absent  --     05/14/24 1600 external  135  normal range  moderate (amplitude range 6 to 25 bpm)  greater than/equal to 15 bpm;lasting at least 15 seconds  absent  --     05/14/24 1545 external  150  normal range  moderate  (amplitude range 6 to 25 bpm)  greater than/equal to 15 bpm;lasting at least 15 seconds  absent  --     05/14/24 1530 external  150  normal range  moderate (amplitude range 6 to 25 bpm)  absent  absent  --     05/14/24 1515 external  145  normal range  moderate (amplitude range 6 to 25 bpm)  greater than/equal to 15 bpm;lasting at least 15 seconds  absent  --     05/14/24 1500 external  150  normal range  moderate (amplitude range 6 to 25 bpm)  greater than/equal to 15 bpm;lasting at least 15 seconds  absent  --     05/14/24 1445 external  150  normal range  moderate (amplitude range 6 to 25 bpm)  greater than/equal to 15 bpm;lasting at least 15 seconds  absent  --     05/14/24 1430 external  150  normal range  moderate (amplitude range 6 to 25 bpm)  greater than/equal to 15 bpm;lasting at least 15 seconds  absent  --     05/14/24 1415 external  145  normal range  moderate (amplitude range 6 to 25 bpm)  greater than/equal to 15 bpm;lasting at least 15 seconds  absent  --     05/14/24 1400 external  150  normal range  moderate (amplitude range 6 to 25 bpm)  greater than/equal to 15 bpm;lasting at least 15 seconds  absent  --     05/14/24 1230 external  140  normal range  moderate (amplitude range 6 to 25 bpm)  absent  absent  --     05/14/24 1215 external  140  normal range  moderate (amplitude range 6 to 25 bpm)  absent  absent  --     05/14/24 1200 external  145  poor tracing  --  --  --  --  --     05/14/24 1145 external  145  normal range  moderate (amplitude range 6 to 25 bpm)  greater than/equal to 15 bpm;lasting at least 15 seconds  absent  --     05/14/24 1130 external  150  poor tracing r/t maternal movement  --  --  --  --  --     05/14/24 1115 external  150  normal range  --  Poor tracing  --  --  --                  [place]  Vaginal Delivery Note  05/14/24  19:30 EDT        Delivery      Delivery: Vaginal, Spontaneous     YOB: 2022    Time of Birth: 6:33 PM   39w1d        Anesthesia: None     Delivering clinician: Francine Wang                      Delivery narrative:     Patient at 39w1d pushed to deliver a viable female infant over an intact perineum without anesthesia. Infant's head, anterior shoulder, and body delivered atraumatically. Vigorous infant was placed on mom's abdomen where the cord was allowed to stop pulsating and was clamped x2 and cut by FOB. Placenta delivered spontaneously and appeared to be intact. Pitocin to IV after delivery of baby. EBL 50. Mother and infant stable, bonding        Infant     Findings: female  infant      Infant observations: Weight: 3370 g (7 lb 6.9 oz)   Length: 19.5  in  Observations/Comments:        Apgars: 8  @ 1 minute /     9  @ 5 minutes            Placenta, Cord, and Fluid     Placenta delivered  Spontaneous  at  5/14/2024  6:38 PM     Cord: 3 vessels  present.   Nuchal Cord?  no   Cord blood obtained: Yes    Cord gases obtained:             No                   Repair     Episiotomy: No         Estimated Blood Loss: 50  mls.   Suture used for repair: None needed      Complications  none     Disposition  Mother to Mother Baby/Postpartum  in stable condition currently.  Baby to remains with mom  in stable condition currently.        Francine Wang CNM  05/14/24  19:30 EDT